# Patient Record
Sex: FEMALE | URBAN - METROPOLITAN AREA
[De-identification: names, ages, dates, MRNs, and addresses within clinical notes are randomized per-mention and may not be internally consistent; named-entity substitution may affect disease eponyms.]

---

## 2020-06-11 ENCOUNTER — OUT OF OFFICE VISIT (OUTPATIENT)
Dept: URBAN - METROPOLITAN AREA MEDICAL CENTER 1 | Facility: MEDICAL CENTER | Age: 85
End: 2020-06-11
Payer: MEDICARE

## 2020-06-11 DIAGNOSIS — R07.89 ACUTE CHEST WALL PAIN: ICD-10-CM

## 2020-06-11 DIAGNOSIS — R10.13 ABDOMINAL DISCOMFORT, EPIGASTRIC: ICD-10-CM

## 2020-06-11 DIAGNOSIS — K29.60 ADENOPAPILLOMATOSIS GASTRICA: ICD-10-CM

## 2020-06-11 DIAGNOSIS — R93.3 ABN FINDINGS-GI TRACT: ICD-10-CM

## 2020-06-11 DIAGNOSIS — Z79.1 ENCOUNTER FOR LONG-TERM (CURRENT) USE OF NSAIDS: ICD-10-CM

## 2020-06-11 PROCEDURE — 99204 OFFICE O/P NEW MOD 45 MIN: CPT | Performed by: INTERNAL MEDICINE

## 2020-06-11 PROCEDURE — 43239 EGD BIOPSY SINGLE/MULTIPLE: CPT | Performed by: INTERNAL MEDICINE

## 2020-07-06 ENCOUNTER — OFFICE VISIT (OUTPATIENT)
Dept: URBAN - NONMETROPOLITAN AREA CLINIC 13 | Facility: CLINIC | Age: 85
End: 2020-07-06

## 2020-07-14 ENCOUNTER — OFFICE VISIT (OUTPATIENT)
Dept: URBAN - NONMETROPOLITAN AREA CLINIC 13 | Facility: CLINIC | Age: 85
End: 2020-07-14
Payer: MEDICARE

## 2020-07-14 DIAGNOSIS — K25.9 GASTRIC ULCER DUE TO CHEMICAL: ICD-10-CM

## 2020-07-14 PROCEDURE — 99213 OFFICE O/P EST LOW 20 MIN: CPT | Performed by: INTERNAL MEDICINE

## 2020-07-14 PROCEDURE — G9903 PT SCRN TBCO ID AS NON USER: HCPCS | Performed by: INTERNAL MEDICINE

## 2020-07-14 PROCEDURE — G8420 CALC BMI NORM PARAMETERS: HCPCS | Performed by: INTERNAL MEDICINE

## 2020-07-14 PROCEDURE — G8427 DOCREV CUR MEDS BY ELIG CLIN: HCPCS | Performed by: INTERNAL MEDICINE

## 2020-07-14 RX ORDER — SUCRALFATE 1 G/1
1 TABLET ON AN EMPTY STOMACH TABLET ORAL QID
Qty: 120 TABLET | Refills: 3

## 2020-07-14 RX ORDER — PANTOPRAZOLE SODIUM 40 MG/1
1 TABLET TABLET, DELAYED RELEASE ORAL BID
Qty: 180 TABLET | Refills: 3

## 2020-07-14 NOTE — HPI-TODAY'S VISIT:
Ms. Jessi Flores is a 89 year old female here for hospital f/u. She was having upper abdominal pain and dark stools bringing her to the ER. She had a CT scan that showed possible ulcer. She had an EGD that showed a moderate-sized clean based ulcer in the gastric antrum with surrounding edema, and moderated edema around the pylorus with ulcerations. The was thought to be related to NSAID use. She was discharged on protonix 40mg BID and carafate QID. She stopped the protonix because of leg pain. She finished the carafate. She continues to have some abdominal pain after eating and has lost 3 pounds. She is only eating soft foods. She denies any blood in her stool or melena. She has minor constipation controlled with miralax. CS

## 2020-07-14 NOTE — PHYSICAL EXAM CONSTITUTIONAL:
well developed, well nourished , in no acute distress , ambulating with walker , normal communication ability

## 2020-10-20 ENCOUNTER — OFFICE VISIT (OUTPATIENT)
Dept: URBAN - NONMETROPOLITAN AREA CLINIC 13 | Facility: CLINIC | Age: 85
End: 2020-10-20
Payer: MEDICARE

## 2020-10-20 ENCOUNTER — DASHBOARD ENCOUNTERS (OUTPATIENT)
Age: 85
End: 2020-10-20

## 2020-10-20 DIAGNOSIS — K25.9 GASTRIC ULCER DUE TO CHEMICAL: ICD-10-CM

## 2020-10-20 PROCEDURE — G8427 DOCREV CUR MEDS BY ELIG CLIN: HCPCS | Performed by: INTERNAL MEDICINE

## 2020-10-20 PROCEDURE — 99214 OFFICE O/P EST MOD 30 MIN: CPT | Performed by: INTERNAL MEDICINE

## 2020-10-20 RX ORDER — PANTOPRAZOLE SODIUM 40 MG/1
1 TABLET TABLET, DELAYED RELEASE ORAL BID
Qty: 180 TABLET | Refills: 3

## 2020-10-20 RX ORDER — SUCRALFATE 1 G/1
1 TABLET ON AN EMPTY STOMACH TABLET ORAL QID
Qty: 120 TABLET | Refills: 3

## 2020-10-20 RX ORDER — PANTOPRAZOLE SODIUM 40 MG/1
1 TABLET TABLET, DELAYED RELEASE ORAL BID
Qty: 180 TABLET | Refills: 3 | Status: ACTIVE | COMMUNITY

## 2020-10-20 RX ORDER — SUCRALFATE 1 G/1
1 TABLET ON AN EMPTY STOMACH TABLET ORAL QID
Qty: 120 TABLET | Refills: 3 | Status: ACTIVE | COMMUNITY

## 2020-10-20 NOTE — HPI-TODAY'S VISIT:
Ms. Jessi Flores is a 89 year old female here for hospital f/u. She was having upper abdominal pain and dark stools bringing her to the ER. She had a CT scan that showed possible ulcer. She had an EGD that showed a moderate-sized clean based ulcer in the gastric antrum with surrounding edema, and moderated edema around the pylorus with ulcerations. The was thought to be related to NSAID use. She was discharged on protonix 40mg BID and carafate QID. She stopped the protonix because of leg pain. She finished the carafate. She continues to have some abdominal pain after eating and has lost 3 pounds. She is only eating soft foods. She denies any blood in her stool or melena. She has minor constipation controlled with miralax. MATILDE Bowen returns for follow-up.  She was seen in the hospital for abdominal pain and melena.  EGD showed a moderate-sized clean-based ulcer in the antrum.  She was placed on twice daily PPI and Carafate.  She has done well with this regimen.  She denies any abdominal pain.  She is tolerating her diet but just has a very poor appetite in general.  Her family states that she does not eat much protein.  She gets full very quickly.  She denies any nausea and vomiting.